# Patient Record
Sex: MALE | Race: OTHER | Employment: FULL TIME | URBAN - METROPOLITAN AREA
[De-identification: names, ages, dates, MRNs, and addresses within clinical notes are randomized per-mention and may not be internally consistent; named-entity substitution may affect disease eponyms.]

---

## 2018-05-14 ENCOUNTER — OFFICE VISIT (OUTPATIENT)
Dept: INTERNAL MEDICINE CLINIC | Facility: CLINIC | Age: 48
End: 2018-05-14

## 2018-05-14 VITALS
SYSTOLIC BLOOD PRESSURE: 120 MMHG | WEIGHT: 231 LBS | TEMPERATURE: 98 F | HEIGHT: 70 IN | DIASTOLIC BLOOD PRESSURE: 73 MMHG | BODY MASS INDEX: 33.07 KG/M2 | HEART RATE: 90 BPM

## 2018-05-14 DIAGNOSIS — Z30.09 VASECTOMY EVALUATION: ICD-10-CM

## 2018-05-14 DIAGNOSIS — M79.671 BILATERAL FOOT PAIN: ICD-10-CM

## 2018-05-14 DIAGNOSIS — M79.672 BILATERAL FOOT PAIN: ICD-10-CM

## 2018-05-14 DIAGNOSIS — J30.89 PERENNIAL ALLERGIC RHINITIS: Primary | ICD-10-CM

## 2018-05-14 PROCEDURE — 99212 OFFICE O/P EST SF 10 MIN: CPT | Performed by: INTERNAL MEDICINE

## 2018-05-14 PROCEDURE — 99213 OFFICE O/P EST LOW 20 MIN: CPT | Performed by: INTERNAL MEDICINE

## 2018-05-14 RX ORDER — MONTELUKAST SODIUM 10 MG/1
10 TABLET ORAL NIGHTLY
Qty: 30 TABLET | Refills: 5 | Status: SHIPPED | OUTPATIENT
Start: 2018-05-14

## 2018-05-14 NOTE — PROGRESS NOTES
Calvin Ramirez is a 49year old male. Patient presents with:  Tingling: seymour. feet  Allergies: getting worse, meds not helping  Consult: vasectomy    HPI:    Keagan Peck presents this evening to discuss several concerns.     For the past few months, he has had int GENERAL: Pleasant male appearing well and breathing comfortably in no distress  /73 (BP Location: Left arm, Patient Position: Sitting, Cuff Size: large)   Pulse 90   Temp 98.1 °F (36.7 °C) (Oral)   Ht 5' 10\" (1.778 m)   Wt 231 lb (104.8 kg)   BMI

## 2018-05-14 NOTE — PATIENT INSTRUCTIONS
Please begin generic Singulair 10 mg daily. Continue Claritin-D and Flonase. Please schedule an appointment with Podiatry and with Urology.

## 2019-03-07 ENCOUNTER — OFFICE VISIT (OUTPATIENT)
Dept: SURGERY | Facility: CLINIC | Age: 49
End: 2019-03-07
Payer: COMMERCIAL

## 2019-03-07 VITALS
DIASTOLIC BLOOD PRESSURE: 77 MMHG | WEIGHT: 231 LBS | TEMPERATURE: 98 F | BODY MASS INDEX: 33.07 KG/M2 | RESPIRATION RATE: 14 BRPM | HEART RATE: 81 BPM | HEIGHT: 70 IN | SYSTOLIC BLOOD PRESSURE: 123 MMHG

## 2019-03-07 DIAGNOSIS — Z30.09 VISIT FOR VASECTOMY EVALUATION: Primary | ICD-10-CM

## 2019-03-07 PROCEDURE — 99244 OFF/OP CNSLTJ NEW/EST MOD 40: CPT | Performed by: UROLOGY

## 2019-03-07 PROCEDURE — 99212 OFFICE O/P EST SF 10 MIN: CPT | Performed by: UROLOGY

## 2019-03-07 RX ORDER — HYDROCODONE BITARTRATE AND ACETAMINOPHEN 5; 325 MG/1; MG/1
1 TABLET ORAL EVERY 6 HOURS PRN
Qty: 5 TABLET | Refills: 0 | Status: SHIPPED | OUTPATIENT
Start: 2019-03-07 | End: 2019-03-08

## 2019-03-07 RX ORDER — DIAZEPAM 5 MG/1
15 TABLET ORAL ONCE
Qty: 3 TABLET | Refills: 0 | Status: SHIPPED | OUTPATIENT
Start: 2019-03-07 | End: 2019-03-07

## 2019-03-07 NOTE — PROGRESS NOTES
[facility], [facility] Urology  Initial Office Consultation    HPI:   Manuela Brown is a 51year old male here today as referred by his PCP Dr. Coral José for evaluation for a bilateral vasectomy as a method of permanent sterilization and family planning.  Dr. Coral José will hours as needed for Pain. Fill this prescription but do not take the pill. Bring it with you to the office on the day of your scheduled procedure and the office staff will instruct you when and how much to take.  Disp: 5 tablet Rfl: 0   Montelukast Sodium 1 tenderness. No hernia. Genitourinary: Rectum normal, prostate normal, testes normal and penis normal. Right testis shows no mass. Left testis shows no mass. Uncircumcised. Penis exhibits no lesions.    Genitourinary Comments: Normal cord structures bilate infection are 1-2%. These rates vary with the surgeon's experience and the criteria used to diagnose these conditions. · Chronic scrotal pain associated with negative impact on quality of life occurs after vasectomy in about 1-2% of men.  Few of these me

## 2019-03-11 ENCOUNTER — TELEPHONE (OUTPATIENT)
Dept: SURGERY | Facility: CLINIC | Age: 49
End: 2019-03-11

## 2019-03-11 NOTE — TELEPHONE ENCOUNTER
Pt.states that he is not able to keep his April appt. So he is requesting to get a new appt. to get his vasectomy done on May 10th.

## 2019-03-14 NOTE — TELEPHONE ENCOUNTER
Called and LVM to call us back. Per patients request, rescheduled to 5/10/19 at 1:30 pm. When patient calls back please inform patient that he has been placed on schedule for 5/10/19 at 1:30pm. Please verify appointment time and date.  Please instruct mayito

## 2019-04-15 ENCOUNTER — APPOINTMENT (OUTPATIENT)
Dept: GENERAL RADIOLOGY | Facility: HOSPITAL | Age: 49
End: 2019-04-15
Payer: COMMERCIAL

## 2019-04-15 ENCOUNTER — HOSPITAL ENCOUNTER (EMERGENCY)
Facility: HOSPITAL | Age: 49
Discharge: HOME | End: 2019-04-15
Payer: COMMERCIAL

## 2019-04-15 VITALS
OXYGEN SATURATION: 98 % | HEIGHT: 70 IN | TEMPERATURE: 98 F | SYSTOLIC BLOOD PRESSURE: 125 MMHG | WEIGHT: 240 LBS | HEART RATE: 90 BPM | DIASTOLIC BLOOD PRESSURE: 86 MMHG | RESPIRATION RATE: 16 BRPM | BODY MASS INDEX: 34.36 KG/M2

## 2019-04-15 DIAGNOSIS — S93.402A SPRAIN OF LEFT ANKLE, UNSPECIFIED LIGAMENT, INITIAL ENCOUNTER: Primary | ICD-10-CM

## 2019-04-15 PROCEDURE — 99283 EMERGENCY DEPT VISIT LOW MDM: CPT

## 2019-04-15 PROCEDURE — 73610 X-RAY EXAM OF ANKLE: CPT

## 2019-04-16 NOTE — ED PROVIDER NOTES
Patient Seen in: [facility] Emergency Department    History   Patient presents with:  Lower Extremity Injury (musculoskeletal)    Stated Complaint: ankle injury    HPI    The patient is a 50-year-old male who twisted his ankle on uneven sidewalk i Skin is warm, dry and intact. Capillary refill takes less than 2 seconds. No bruising, no ecchymosis and no rash noted. No erythema. Psychiatric: He has a normal mood and affect. Nursing note and vitals reviewed.     Differential diagnosis includes spra

## 2019-04-16 NOTE — ED INITIAL ASSESSMENT (HPI)
Pt presents for eval of L ankle pain and swelling s/p fall while in [place] 6 days ago. Pt ambulatory, but c/o pain.

## 2019-05-09 ENCOUNTER — TELEPHONE (OUTPATIENT)
Dept: SURGERY | Facility: CLINIC | Age: 49
End: 2019-05-09

## 2019-06-05 ENCOUNTER — TELEPHONE (OUTPATIENT)
Dept: SURGERY | Facility: CLINIC | Age: 49
End: 2019-06-05

## 2019-06-11 NOTE — TELEPHONE ENCOUNTER
Called patient and left message offering appointment for vasectomy on 8/2/19 @ 130pm patient was told in message that he would need to arrive at 1230pm. Asked patient to call office to confirm he accepts appointment.

## 2019-06-24 ENCOUNTER — OFFICE VISIT (OUTPATIENT)
Dept: INTERNAL MEDICINE CLINIC | Facility: CLINIC | Age: 49
End: 2019-06-24
Payer: COMMERCIAL

## 2019-06-24 ENCOUNTER — APPOINTMENT (OUTPATIENT)
Dept: LAB | Facility: HOSPITAL | Age: 49
End: 2019-06-24
Attending: INTERNAL MEDICINE
Payer: COMMERCIAL

## 2019-06-24 VITALS
SYSTOLIC BLOOD PRESSURE: 119 MMHG | DIASTOLIC BLOOD PRESSURE: 78 MMHG | HEART RATE: 76 BPM | WEIGHT: 249 LBS | BODY MASS INDEX: 35.65 KG/M2 | HEIGHT: 70 IN | TEMPERATURE: 98 F

## 2019-06-24 DIAGNOSIS — R11.0 NAUSEA: ICD-10-CM

## 2019-06-24 DIAGNOSIS — M25.512 ACUTE PAIN OF BOTH SHOULDERS: Primary | ICD-10-CM

## 2019-06-24 DIAGNOSIS — M25.511 ACUTE PAIN OF BOTH SHOULDERS: Primary | ICD-10-CM

## 2019-06-24 PROCEDURE — 99213 OFFICE O/P EST LOW 20 MIN: CPT | Performed by: INTERNAL MEDICINE

## 2019-06-24 PROCEDURE — 85027 COMPLETE CBC AUTOMATED: CPT

## 2019-06-24 PROCEDURE — 36415 COLL VENOUS BLD VENIPUNCTURE: CPT

## 2019-06-24 PROCEDURE — 80053 COMPREHEN METABOLIC PANEL: CPT

## 2019-06-24 PROCEDURE — 99212 OFFICE O/P EST SF 10 MIN: CPT | Performed by: INTERNAL MEDICINE

## 2019-06-24 NOTE — PATIENT INSTRUCTIONS
Please avoid painful activity and apply heat to both shoulders 2-3 times daily. Take ibuprofen OTC as needed for pain relief. Await results of today's blood tests. Call if no better. Please schedule a physical soon.

## 2019-06-24 NOTE — PROGRESS NOTES
Pramod Downey is a 51year old male. Patient presents with:  Nausea  Shoulder Pain    HPI:   Mr. Heriberto Wisdom presents this afternoon, accompanied by his son, to discuss 2 concerns.     For the past 3 weeks he has had persistent intermittent bilateral right greater Cigarettes        Quit date: 1994        Years since quittin.0      Smokeless tobacco: Never Used    Alcohol use: No      Alcohol/week: 0.0 oz    Drug use: No       EXAM:   GENERAL: Pleasant male appearing well in no distress  /78 (BP Locati

## 2019-07-31 ENCOUNTER — TELEPHONE (OUTPATIENT)
Dept: SURGERY | Facility: CLINIC | Age: 49
End: 2019-07-31

## 2019-07-31 DIAGNOSIS — Z30.09 VISIT FOR VASECTOMY EVALUATION: Primary | ICD-10-CM

## 2019-07-31 NOTE — TELEPHONE ENCOUNTER
Pt called stating pt is scheduled for a vas on 8-2-19. Pt requesting the rx. For a sedative be called into the [facility].   Call pt to advise

## 2019-08-01 RX ORDER — HYDROCODONE BITARTRATE AND ACETAMINOPHEN 5; 325 MG/1; MG/1
1 TABLET ORAL EVERY 6 HOURS PRN
Qty: 5 TABLET | Refills: 0 | Status: SHIPPED | OUTPATIENT
Start: 2019-08-01 | End: 2019-08-03

## 2019-08-01 RX ORDER — DIAZEPAM 5 MG/1
15 TABLET ORAL ONCE
Qty: 3 TABLET | Refills: 0 | Status: SHIPPED | OUTPATIENT
Start: 2019-08-01 | End: 2019-08-01

## 2019-08-01 NOTE — TELEPHONE ENCOUNTER
Spoke with patient. States he never filled script for medications. Advised patient that MD will need sign new script for meds. PT states he is not able to come in today, states he can come in first thing tomorrow morning.      Please advise

## 2019-08-02 ENCOUNTER — OFFICE VISIT (OUTPATIENT)
Dept: SURGERY | Facility: CLINIC | Age: 49
End: 2019-08-02
Payer: COMMERCIAL

## 2019-08-02 VITALS
DIASTOLIC BLOOD PRESSURE: 80 MMHG | WEIGHT: 250 LBS | BODY MASS INDEX: 36 KG/M2 | TEMPERATURE: 98 F | SYSTOLIC BLOOD PRESSURE: 122 MMHG | HEART RATE: 74 BPM

## 2019-08-02 DIAGNOSIS — Z98.52 S/P VASECTOMY: ICD-10-CM

## 2019-08-02 DIAGNOSIS — Z30.2 ENCOUNTER FOR STERILIZATION: Primary | ICD-10-CM

## 2019-08-02 PROCEDURE — 55250 REMOVAL OF SPERM DUCT(S): CPT | Performed by: UROLOGY

## 2019-08-02 NOTE — PROCEDURES
UROLOGY PROCEDURE NOTE  NO-SCALPEL BILATERAL VASECTOMY      PREOPERATIVE DIAGNOSIS: Desires voluntary sterilization - bilateral vasectomy. POSTOPERATIVE DIAGNOSIS: Desires voluntary sterilization - bilateral vasectomy.     PROCEDURE PERFORMED: Voluntary segment of vas deferens was sent separately in formalin to pathology for identification. The patient tolerated the procedure well.      Donna Lee MD  08/02/19 2:46 PM

## 2019-12-10 ENCOUNTER — WALK IN (OUTPATIENT)
Dept: URGENT CARE | Age: 49
End: 2019-12-10

## 2019-12-10 VITALS
SYSTOLIC BLOOD PRESSURE: 124 MMHG | HEIGHT: 70 IN | BODY MASS INDEX: 34.36 KG/M2 | TEMPERATURE: 98 F | DIASTOLIC BLOOD PRESSURE: 72 MMHG | RESPIRATION RATE: 18 BRPM | HEART RATE: 77 BPM | WEIGHT: 240 LBS | OXYGEN SATURATION: 98 %

## 2019-12-10 DIAGNOSIS — J01.00 ACUTE NON-RECURRENT MAXILLARY SINUSITIS: Primary | ICD-10-CM

## 2019-12-10 PROCEDURE — 99203 OFFICE O/P NEW LOW 30 MIN: CPT | Performed by: NURSE PRACTITIONER

## 2019-12-10 RX ORDER — AMOXICILLIN 875 MG/1
875 TABLET, COATED ORAL 2 TIMES DAILY
Qty: 20 TABLET | Refills: 0 | Status: SHIPPED | OUTPATIENT
Start: 2019-12-10 | End: 2019-12-20

## 2019-12-10 SDOH — HEALTH STABILITY: MENTAL HEALTH: HOW MANY STANDARD DRINKS CONTAINING ALCOHOL DO YOU HAVE ON A TYPICAL DAY?: 3 OR 4

## 2019-12-10 SDOH — HEALTH STABILITY: MENTAL HEALTH: HOW OFTEN DO YOU HAVE A DRINK CONTAINING ALCOHOL?: 2-3 TIMES A WEEK

## 2019-12-10 ASSESSMENT — ENCOUNTER SYMPTOMS
HEADACHES: 1
CONSTITUTIONAL NEGATIVE: 1
RHINORRHEA: 1
SINUS PRESSURE: 1
PSYCHIATRIC NEGATIVE: 1
EYES NEGATIVE: 1
RESPIRATORY NEGATIVE: 1
GASTROINTESTINAL NEGATIVE: 1

## 2020-02-27 ENCOUNTER — OFFICE VISIT (OUTPATIENT)
Dept: INTERNAL MEDICINE CLINIC | Facility: CLINIC | Age: 50
End: 2020-02-27
Payer: COMMERCIAL

## 2020-02-27 VITALS
SYSTOLIC BLOOD PRESSURE: 117 MMHG | OXYGEN SATURATION: 98 % | HEART RATE: 75 BPM | HEIGHT: 70 IN | TEMPERATURE: 98 F | DIASTOLIC BLOOD PRESSURE: 74 MMHG | WEIGHT: 247 LBS | BODY MASS INDEX: 35.36 KG/M2

## 2020-02-27 DIAGNOSIS — J06.0 SORE THROAT AND LARYNGITIS: Primary | ICD-10-CM

## 2020-02-27 PROCEDURE — 99213 OFFICE O/P EST LOW 20 MIN: CPT | Performed by: INTERNAL MEDICINE

## 2020-02-27 RX ORDER — AMOXICILLIN AND CLAVULANATE POTASSIUM 875; 125 MG/1; MG/1
1 TABLET, FILM COATED ORAL 2 TIMES DAILY
Qty: 14 TABLET | Refills: 0 | Status: SHIPPED | OUTPATIENT
Start: 2020-02-27 | End: 2020-03-05

## 2020-02-27 NOTE — PROGRESS NOTES
Korina Andre is a 47year old male. Patient presents with:  Sore Throat: onset 2 weeks ago, seems to be getting worse, cough with some phlegm     HPI:   57-year-old gentleman with no significant medical history here for evaluation of sore throat.   He reports x2   • Other (Parkinson's) Father         Seasonal                Runny nose     REVIEW OF SYSTEMS:     Review of Systems   Constitutional: Negative for activity change, appetite change and fever. HENT: Positive for congestion and sore throat.  Negative laryngitis   -I gave him the option of doing a strep throat culture versus antibiotics. He is in the process of moving to [facility] [place] by [facility] and cannot take off from work. I encouraged him to do saltwater gargling along with antihistamines at night.   Provided

## 2020-02-27 NOTE — PATIENT INSTRUCTIONS
Salt water gargling 2-3 times a day  Antihistamines at night  Antibiotics 2 times a day for 7 days  Hopefully her symptoms will get better in the next 3 to 4 days. Please call me if there is no improvement.     Good luck with your moving!!

## 2020-03-19 ENCOUNTER — TELEPHONE (OUTPATIENT)
Dept: INTERNAL MEDICINE CLINIC | Facility: CLINIC | Age: 50
End: 2020-03-19

## 2020-03-19 RX ORDER — BENZONATATE 100 MG/1
100 CAPSULE ORAL 3 TIMES DAILY PRN
Qty: 20 CAPSULE | Refills: 0 | Status: SHIPPED | OUTPATIENT
Start: 2020-03-19

## 2020-03-19 RX ORDER — AZITHROMYCIN 250 MG/1
TABLET, FILM COATED ORAL
Qty: 6 TABLET | Refills: 0 | Status: SHIPPED | OUTPATIENT
Start: 2020-03-19

## 2020-03-19 RX ORDER — PROMETHAZINE HYDROCHLORIDE AND CODEINE PHOSPHATE 6.25; 1 MG/5ML; MG/5ML
5 SYRUP ORAL EVERY 8 HOURS PRN
Qty: 118 ML | Refills: 0 | Status: SHIPPED | OUTPATIENT
Start: 2020-03-19

## 2020-03-19 NOTE — TELEPHONE ENCOUNTER
Pt called for dry cough , no fever or chills had URTI few weeks back   Provided with prometh codiene     JA

## 2020-03-19 NOTE — TELEPHONE ENCOUNTER
Spoke with patient this evening 3-19. Persistent symptoms for the past 3 weeks of nasal congestion and nonproductive coughing. No fever or achiness. No sinus pain, ear pain or sore throat. No shortness of breath.   No recent travel or known COVID 19 exp

## 2020-03-19 NOTE — TELEPHONE ENCOUNTER
Your patient called with congestion and cough for about 3 weeks. He states that he saw Dr. Wilder Crocker but the abx did not help. Message routed to PCP and on-call, Dr. Olegario Pfeiffer.      Please contact patient to determine if they should be treated over the

## (undated) NOTE — ED AVS SNAPSHOT
Korina Andre   MRN: [id number]    Department:  [facility] Emergency Department   Date of Visit:  4/15/2019           Disclosure     Insurance plans vary and the physician(s) referred by the ER may not be covered by your plan.  Please contact your CARE PHYSICIAN AT ONCE OR RETURN IMMEDIATELY TO THE EMERGENCY DEPARTMENT. If you have been prescribed any medication(s), please fill your prescription right away and begin taking the medication(s) as directed.   If you believe that any of the medications